# Patient Record
Sex: FEMALE | URBAN - METROPOLITAN AREA
[De-identification: names, ages, dates, MRNs, and addresses within clinical notes are randomized per-mention and may not be internally consistent; named-entity substitution may affect disease eponyms.]

---

## 2020-03-17 ENCOUNTER — OFFICE VISIT (OUTPATIENT)
Dept: PRIMARY CARE CLINIC | Age: 60
End: 2020-03-17

## 2020-03-17 VITALS — OXYGEN SATURATION: 98 % | TEMPERATURE: 99.6 F | HEART RATE: 90 BPM

## 2020-03-17 LAB
INFLUENZA A ANTIBODY: NORMAL
INFLUENZA B ANTIBODY: NORMAL

## 2020-03-17 PROCEDURE — 99212 OFFICE O/P EST SF 10 MIN: CPT | Performed by: NURSE PRACTITIONER

## 2020-03-17 PROCEDURE — 87804 INFLUENZA ASSAY W/OPTIC: CPT | Performed by: NURSE PRACTITIONER

## 2020-03-17 NOTE — PROGRESS NOTES
3/17/2020    FLU/COVID-19 CLINIC EVALUATION    HPI  SYMPTOMS:    Symptom duration, days:  [] 1   [] 2   [] 3   [] 4   [] 5   [] 6   [] 7   [] 8   [] 9   [x] 10   [] 11   [] 12   [] 13   [] 14 or greater    Symptom course:   [x] Worsening     [] Stable     [] Improving    [] Fevers  [x] Symptom (not measured)  [] Measured (Result: )  [x] Chills  [x] Cough  [] Coughing up blood  [] Chest Congestion  [] Nasal Congestion  [] Feeling short of breath  [] Sometimes  [] Frequently  [] All the time  [] Chest pain  [] Headaches  []Tolerable  [] Severe  [x] Sore throat swollen glands  [] Muscle aches  [] Nausea  [] Vomiting  []Unable to keep fluids down  [] Diarrhea  []Severe    [] OTHER SYMPTOMS:      RISK FACTORS:    [] Pregnant or possibly pregnant  [] Age over 61  [] Diabetes  [] Heart disease  [] Asthma  [] COPD/Other chronic lung diseases  [] Active Cancer  [] On Chemotherapy  [] Taking oral steroids  [] History Lymphoma/Leukemia  [] Close contact with a lab confirmed COVID-19 patient within 14 days of symptom onset  [] History of travel from affected geographical areas within 14 days of symptom onset       VITALS:  Vitals:    03/17/20 1550   Pulse: 90   Temp: 99.6 °F (37.6 °C)   TempSrc: Temporal   SpO2: 98%        TESTS:    POCT FLU:  [] Positive     [x]Negative    [x] COVID-19 Test sent:      ASSESSMENT:    [x] Flu  [x] Possible COVID-19    PLAN:    [x] Discharge home with written instructions for:  [x] Flu management  [x] Possible COVID-19 infection with self-quarantine and management of symptoms  [x] Follow-up with primary care physician or emergency department if worsens  [] Evaluation per physician/nurse practitioner in clinic      An  electronic signature was used to authenticate this note.      --Zac Guzmán RN on 3/17/2020 at 3:55 PM

## 2020-03-17 NOTE — PATIENT INSTRUCTIONS
facility. These steps will help the healthcare provider's office to keep other people in the office or waiting room from getting infected or exposed. ; Alert health department: Ask your healthcare provider to call the local or state health department. Persons who are placed under active monitoring or facilitated self-monitoring should follow instructions provided by their local health department or occupational health professionals, as appropriate.  ; Call 911 if you have a medical emergency: If you have a medical emergency and need to call 911, notify the dispatch personnel that you have, or are being evaluated for COVID-19. If possible, put on a facemask before emergency medical services arrive. How to manage your symptoms   Take over the counter medications to manage your symptoms as you are able and tolerate:   o Tylenol or Advil as directed for fever, aches or pains  o Mucinex as directed to help with mucus and congestion  o Over the counter cough medicine - check with your primary care provider or pharmacist for suggestions   Maintain nutrition especially fluids - it is important to stay well hydrated. o Water is best.  Shreyas Mckeon If you have diabetes, please be careful and monitor your blood sugars.

## 2020-03-24 ENCOUNTER — TELEPHONE (OUTPATIENT)
Dept: FAMILY MEDICINE CLINIC | Age: 60
End: 2020-03-24

## 2020-03-30 ENCOUNTER — TELEPHONE (OUTPATIENT)
Dept: FAMILY MEDICINE CLINIC | Age: 60
End: 2020-03-30

## 2020-04-03 LAB
REPORT: NORMAL
SARS-COV-2: NOT DETECTED
THIS TEST SENT TO: NORMAL

## 2020-04-06 ENCOUNTER — TELEPHONE (OUTPATIENT)
Dept: FAMILY MEDICINE CLINIC | Age: 60
End: 2020-04-06

## 2020-04-06 NOTE — TELEPHONE ENCOUNTER
Patient contacted with negative testing results. Feeling well but she couldn't go back to work. Please fax results/email to HR.

## 2020-07-20 ENCOUNTER — TELEPHONE (OUTPATIENT)
Dept: ORTHOPEDIC SURGERY | Age: 60
End: 2020-07-20

## 2020-07-20 NOTE — TELEPHONE ENCOUNTER
FAXED [facility] ) 2004 TO PRESENT TO Angelita Linder MD @ [address] @ [phone]

## 2023-02-06 ENCOUNTER — OFFICE VISIT (OUTPATIENT)
Dept: ORTHOPEDIC SURGERY | Age: 63
End: 2023-02-06

## 2023-02-06 VITALS — HEIGHT: 64 IN | BODY MASS INDEX: 38.41 KG/M2 | WEIGHT: 225 LBS

## 2023-02-06 DIAGNOSIS — M70.62 GREATER TROCHANTERIC BURSITIS OF LEFT HIP: ICD-10-CM

## 2023-02-06 DIAGNOSIS — M16.12 PRIMARY OSTEOARTHRITIS OF LEFT HIP: ICD-10-CM

## 2023-02-06 DIAGNOSIS — M76.32 ILIOTIBIAL BAND SYNDROME OF LEFT SIDE: ICD-10-CM

## 2023-02-06 DIAGNOSIS — M25.552 LEFT HIP PAIN: Primary | ICD-10-CM

## 2023-02-06 PROCEDURE — 20610 DRAIN/INJ JOINT/BURSA W/O US: CPT | Performed by: FAMILY MEDICINE

## 2023-02-06 PROCEDURE — 99203 OFFICE O/P NEW LOW 30 MIN: CPT | Performed by: FAMILY MEDICINE

## 2023-02-06 RX ORDER — BETAMETHASONE SODIUM PHOSPHATE AND BETAMETHASONE ACETATE 3; 3 MG/ML; MG/ML
6 INJECTION, SUSPENSION INTRA-ARTICULAR; INTRALESIONAL; INTRAMUSCULAR; SOFT TISSUE ONCE
Status: COMPLETED | OUTPATIENT
Start: 2023-02-06 | End: 2023-02-06

## 2023-02-06 RX ORDER — ALBUTEROL SULFATE 90 UG/1
2 AEROSOL, METERED RESPIRATORY (INHALATION) EVERY 6 HOURS PRN
COMMUNITY
Start: 2017-01-03

## 2023-02-06 RX ORDER — IBUPROFEN 600 MG/1
600 TABLET ORAL EVERY 6 HOURS PRN
COMMUNITY
Start: 2017-01-03

## 2023-02-06 RX ORDER — BUPIVACAINE HYDROCHLORIDE 2.5 MG/ML
1 INJECTION, SOLUTION INFILTRATION; PERINEURAL ONCE
Status: COMPLETED | OUTPATIENT
Start: 2023-02-06 | End: 2023-02-06

## 2023-02-06 RX ORDER — LIDOCAINE HYDROCHLORIDE 10 MG/ML
1 INJECTION, SOLUTION INFILTRATION; PERINEURAL ONCE
Status: COMPLETED | OUTPATIENT
Start: 2023-02-06 | End: 2023-02-06

## 2023-02-06 RX ORDER — ASPIRIN 81 MG/1
81 TABLET, CHEWABLE ORAL DAILY
COMMUNITY

## 2023-02-06 RX ORDER — PAROXETINE HYDROCHLORIDE 20 MG/1
20 TABLET, FILM COATED ORAL EVERY MORNING
COMMUNITY
Start: 2020-06-05

## 2023-02-06 RX ORDER — DIAZEPAM 5 MG/1
5 TABLET ORAL EVERY 6 HOURS PRN
COMMUNITY
Start: 2015-08-26

## 2023-02-06 RX ORDER — DICLOFENAC SODIUM 75 MG/1
75 TABLET, DELAYED RELEASE ORAL 2 TIMES DAILY
Qty: 60 TABLET | Refills: 3 | Status: SHIPPED | OUTPATIENT
Start: 2023-02-06

## 2023-02-06 RX ORDER — METHYLPREDNISOLONE 4 MG/1
TABLET ORAL
COMMUNITY
Start: 2023-01-23

## 2023-02-06 RX ORDER — HYDROCODONE BITARTRATE AND ACETAMINOPHEN 5; 325 MG/1; MG/1
1 TABLET ORAL EVERY 6 HOURS PRN
COMMUNITY
Start: 2015-08-26

## 2023-02-06 RX ORDER — HYDROCHLOROTHIAZIDE 25 MG/1
25 TABLET ORAL DAILY
COMMUNITY
Start: 2020-06-05

## 2023-02-06 RX ADMIN — BUPIVACAINE HYDROCHLORIDE 2.5 MG: 2.5 INJECTION, SOLUTION INFILTRATION; PERINEURAL at 09:09

## 2023-02-06 RX ADMIN — LIDOCAINE HYDROCHLORIDE 1 ML: 10 INJECTION, SOLUTION INFILTRATION; PERINEURAL at 09:09

## 2023-02-06 RX ADMIN — BETAMETHASONE SODIUM PHOSPHATE AND BETAMETHASONE ACETATE 6 MG: 3; 3 INJECTION, SUSPENSION INTRA-ARTICULAR; INTRALESIONAL; INTRAMUSCULAR; SOFT TISSUE at 09:08

## 2023-02-06 NOTE — LETTER
[address] [place] [address] Dr Dixie Huitron [facility] [address]  Phone: [phone]  Fax: [phone]    Jv Duvall MD        February 6, 2023     Patient: German Martel   YOB: 1960   Date of Visit: 2/6/2023       To Whom It May Concern: It is my medical opinion that Jeremy Black may return to light duty immediately with the following restrictions: lifting/carrying not to exceed 15-20 lbs. , pushing/pulling not to exceed 15-20 lbs. These restrictions will be in place for the next 1-2 weeks. If you have any questions or concerns, please don't hesitate to call.     Sincerely,        Jv Duvall MD

## 2023-02-06 NOTE — PROGRESS NOTES
Chief Complaint    Hip Pain (LEFT HIP, unknown injury about 2 weeks ago with increased pain after working and was unable to walk and she now has pain in her buttocks and lateral hip.)    Initial consultation acute onset and left lateral hip pain with difficulty walking    History of Present Illness:  Maira Porras is a 65 y.o. 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 She states she woke that morning was doing reasonably well but had progressive soreness through her work shift to the point where she was unable to bear weight coming home from work with a very severe 8-9 out of 10 pain. There is no history of injury fall or trauma. With her inability to bear weight she did go to the emergency room at [facility] where x-rays suggested some mild hip osteoarthritis but she did have a follow-up CT of her hip which did confirm the more mild hip osteoarthritis without evidence of acute fracture. She was placed on a Medrol Dosepak to be followed by some lower doses of ibuprofen which has resulted about a 60 to 70% improvement of her pain symptoms. Initially they were 9 out of 10 and very sharp they have now morphed about a 3 out of 10. 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 Denies active locking catching crepitation or true radicular symptoms. She has no previous history of hip injury. She is being seen today for orthopedic and sports consultation with review of her previous plain films and CT from [facility] 1/23/2023.          Medical History  Patient's medications, allergies, past medical, surgical, social and family histories were reviewed and updated as appropriate. Review of Systems  Pertinent items are noted in HPI  Review of systems reviewed from Patient History Form dated on 2/6/2023 and available in the patient's chart under the Media tab. Vital Signs  There were no vitals filed for this visit. General Exam:     Constitutional: Patient is adequately groomed with no evidence of malnutrition  DTRs: Deep tendon reflexes are intact  Mental Status: The patient is oriented to time, place and person. The patient's mood and affect are appropriate. Lymphatic: The lymphatic examination bilaterally reveals all areas to be without enlargement or induration. Vascular: Examination reveals no swelling or calf tenderness. Peripheral pulses are palpable and 2+. Neurological: The patient has good coordination. There is no weakness or sensory deficit. Hip Examination    Inspection: There is no high-grade deformity or substantial soft tissue swelling. No palpable masses. Palpation: She does have some residual tenderness over the trochanteric bursa on the left as well as over her IT band proximally. No anterior hip flexor and with mild gluteal discomfort. Rang of Motion: She does have tightness to hamstrings and IT band but reasonable hip range of motion. Strength: She does have some residual hip weakness 4 out of 5 with hip flexion and abduction. Special Tests: Positive Carol's testing but negative straight leg raising. Logroll testing does not appear to be overwhelmingly positive with mild discomfort with hip impingement testing. Skin: There are no rashes, ulcerations or lesions. No motor or sensory and vascular appears intact. Gait: Reasonable gait. Reflex symmetrically preserved    Additional Comments:     Additional Examinations:  Contralateral Exam: Examination of the right hip reveals intact skin.   The patient demonstrates full painless range of motion with regards to flexion, abduction, internal and external rotation. There is not tenderness about the greater trochanter. There is a negative straight leg raise against resistance. Strength is 5/5 thorough out all planes. Right Lower Extremity: Examination of the right lower extremity does not show any tenderness, deformity or injury. Range of motion is unremarkable. There is no gross instability. There are no rashes, ulcerations or lesions. Strength and tone are normal.  Left Lower Extremity: Examination of the left lower extremity does not show any tenderness, deformity or injury. Range of motion is unremarkable. There is no gross instability. There are no rashes, ulcerations or lesions. Strength and tone are normal.  Lower Back: Examination of the lower back does not show any tenderness, deformity or injury. Range of motion is unremarkable. There is no gross instability. There are no rashes, ulcerations or lesions. Strength and tone are normal.      Diagnostic Test Findings: Left hip AP pelvis and frog films were reviewed from [facility] 1/23/2023 showing mild hip osteoarthritis without acute fracture. She has a follow-up CT scan of her left hip the same day 1/23/2023 which did not show fracture but mild hip osteoarthritis noted. These are noted in the care everywhere tab. Assessment : #1.  2+ weeks status post acute symptomatic left hip pain with mild left hip osteoarthritis and reactive trochanteric bursitis with hip weakness    Impression:  Encounter Diagnoses   Name Primary?     Left hip pain Yes    Greater trochanteric bursitis of left hip     Primary osteoarthritis of left hip     Iliotibial band syndrome of left side        Office Procedures:  Orders Placed This Encounter   Procedures    External Referral To Physical Therapy     Referral Priority:   Routine     Referral Type:   Eval and Treat     Referral Reason:   Specialty Services Required Requested Specialty:   Physical Therapist     Number of Visits Requested:   1    HI ARTHROCENTESIS ASPIR&/INJ MAJOR JT/BURSA W/O US       Treatment Plan: Treatment options were discussed with the niarturo today. We did review her plain films and exam findings. She was quite symptomatic a couple weeks ago when her symptoms started on 1/23/2023. Her symptoms have improved about 60 to 70% but does have residual pain over the trochanteric bursa. After discussing options, we did perform a steroid injection to her left trochanteric bursa today using 1 cc of Celestone, 1 cc of Marcaine, 1 cc of Xylocaine. As she does not have insurance currently and is in the process of applying for Medicaid, we did start her on a home-based hip strengthening and flexibility program emphasizing IT band flexibility and was started on diclofenac 75 mg 1 pill twice daily. I do think she is okay to return back to work but would avoid lifting more than 15 to 20 pounds until she can continue to improve and get stronger with allowance of rest from prolonged standing and walking based on pain. Once again she is in the process of applying for Medicaid and if this comes through we will see her back in the office and may need to consider formal therapy. She may work with a therapist at the [place] [id number] currently if possible and a prescription was written. We may need to consider imaging if she is failing to improve. She will consult any interval questions or concerns        This dictation was performed with a verbal recognition program (DRAGON) and it was checked for errors. It is possible that there are still dictated errors within this office note. If so, please bring any errors to my attention for an addendum. All efforts were made to ensure that this office note is accurate.

## 2025-08-10 ENCOUNTER — HOSPITAL ENCOUNTER (EMERGENCY)
Age: 65
Discharge: HOME | End: 2025-08-10
Payer: COMMERCIAL

## 2025-08-10 ENCOUNTER — APPOINTMENT (OUTPATIENT)
Dept: GENERAL RADIOLOGY | Age: 65
End: 2025-08-10
Payer: COMMERCIAL

## 2025-08-10 VITALS
SYSTOLIC BLOOD PRESSURE: 188 MMHG | OXYGEN SATURATION: 97 % | WEIGHT: 238.4 LBS | RESPIRATION RATE: 19 BRPM | DIASTOLIC BLOOD PRESSURE: 100 MMHG | HEART RATE: 85 BPM | TEMPERATURE: 98.6 F | BODY MASS INDEX: 40.7 KG/M2 | HEIGHT: 64 IN

## 2025-08-10 DIAGNOSIS — S46.912A STRAIN OF LEFT SHOULDER, INITIAL ENCOUNTER: Primary | ICD-10-CM

## 2025-08-10 DIAGNOSIS — I10 UNCONTROLLED HYPERTENSION: ICD-10-CM

## 2025-08-10 PROCEDURE — 99283 EMERGENCY DEPT VISIT LOW MDM: CPT

## 2025-08-10 PROCEDURE — 73030 X-RAY EXAM OF SHOULDER: CPT

## 2025-08-10 PROCEDURE — 6370000000 HC RX 637 (ALT 250 FOR IP): Performed by: PHYSICIAN ASSISTANT

## 2025-08-10 RX ORDER — LISINOPRIL 10 MG/1
10 TABLET ORAL DAILY
Qty: 30 TABLET | Refills: 0 | Status: SHIPPED | OUTPATIENT
Start: 2025-08-10

## 2025-08-10 RX ORDER — LIDOCAINE 4 G/G
1 PATCH TOPICAL DAILY
Status: DISCONTINUED | OUTPATIENT
Start: 2025-08-10 | End: 2025-08-10 | Stop reason: HOSPADM

## 2025-08-10 RX ORDER — LISINOPRIL 10 MG/1
5 TABLET ORAL DAILY
COMMUNITY
End: 2025-08-10

## 2025-08-10 ASSESSMENT — PAIN DESCRIPTION - LOCATION: LOCATION: SHOULDER

## 2025-08-10 ASSESSMENT — PAIN SCALES - GENERAL: PAINLEVEL_OUTOF10: 9

## 2025-08-10 ASSESSMENT — PAIN DESCRIPTION - ORIENTATION: ORIENTATION: LEFT
